# Patient Record
Sex: MALE | Race: WHITE | NOT HISPANIC OR LATINO | Employment: FULL TIME | URBAN - METROPOLITAN AREA
[De-identification: names, ages, dates, MRNs, and addresses within clinical notes are randomized per-mention and may not be internally consistent; named-entity substitution may affect disease eponyms.]

---

## 2018-03-21 ENCOUNTER — TRANSCRIBE ORDERS (OUTPATIENT)
Dept: ADMINISTRATIVE | Facility: HOSPITAL | Age: 43
End: 2018-03-21

## 2018-03-21 DIAGNOSIS — L03.113 CELLULITIS OF RIGHT HAND: Primary | ICD-10-CM

## 2018-03-22 ENCOUNTER — LAB REQUISITION (OUTPATIENT)
Dept: LAB | Facility: HOSPITAL | Age: 43
End: 2018-03-22

## 2018-03-22 DIAGNOSIS — Z00.00 ROUTINE GENERAL MEDICAL EXAMINATION AT A HEALTH CARE FACILITY: ICD-10-CM

## 2018-03-22 LAB
ALBUMIN SERPL-MCNC: 4.5 G/DL (ref 3.2–4.8)
ALBUMIN/GLOB SERPL: 1.5 G/DL (ref 1.5–2.5)
ALP SERPL-CCNC: 50 U/L (ref 25–100)
ALT SERPL W P-5'-P-CCNC: 30 U/L (ref 7–40)
ANION GAP SERPL CALCULATED.3IONS-SCNC: 9 MMOL/L (ref 3–11)
AST SERPL-CCNC: 25 U/L (ref 0–33)
BASOPHILS # BLD AUTO: 0.01 10*3/MM3 (ref 0–0.2)
BASOPHILS NFR BLD AUTO: 0.2 % (ref 0–1)
BILIRUB SERPL-MCNC: 0.6 MG/DL (ref 0.3–1.2)
BUN BLD-MCNC: 12 MG/DL (ref 9–23)
BUN/CREAT SERPL: 15 (ref 7–25)
CALCIUM SPEC-SCNC: 9.1 MG/DL (ref 8.7–10.4)
CHLORIDE SERPL-SCNC: 104 MMOL/L (ref 99–109)
CO2 SERPL-SCNC: 26 MMOL/L (ref 20–31)
CREAT BLD-MCNC: 0.8 MG/DL (ref 0.6–1.3)
CRP SERPL-MCNC: 0.04 MG/DL (ref 0–1)
DEPRECATED RDW RBC AUTO: 40.7 FL (ref 37–54)
EOSINOPHIL # BLD AUTO: 0.08 10*3/MM3 (ref 0–0.3)
EOSINOPHIL NFR BLD AUTO: 1.6 % (ref 0–3)
ERYTHROCYTE [DISTWIDTH] IN BLOOD BY AUTOMATED COUNT: 12.8 % (ref 11.3–14.5)
ERYTHROCYTE [SEDIMENTATION RATE] IN BLOOD: 8 MM/HR (ref 0–15)
GFR SERPL CREATININE-BSD FRML MDRD: 105 ML/MIN/1.73
GLOBULIN UR ELPH-MCNC: 3 GM/DL
GLUCOSE BLD-MCNC: 104 MG/DL (ref 70–100)
HCT VFR BLD AUTO: 40.6 % (ref 38.9–50.9)
HGB BLD-MCNC: 13.7 G/DL (ref 13.1–17.5)
IMM GRANULOCYTES # BLD: 0.01 10*3/MM3 (ref 0–0.03)
IMM GRANULOCYTES NFR BLD: 0.2 % (ref 0–0.6)
LYMPHOCYTES # BLD AUTO: 1.83 10*3/MM3 (ref 0.6–4.8)
LYMPHOCYTES NFR BLD AUTO: 37 % (ref 24–44)
MCH RBC QN AUTO: 29.3 PG (ref 27–31)
MCHC RBC AUTO-ENTMCNC: 33.7 G/DL (ref 32–36)
MCV RBC AUTO: 86.8 FL (ref 80–99)
MONOCYTES # BLD AUTO: 0.23 10*3/MM3 (ref 0–1)
MONOCYTES NFR BLD AUTO: 4.7 % (ref 0–12)
NEUTROPHILS # BLD AUTO: 2.78 10*3/MM3 (ref 1.5–8.3)
NEUTROPHILS NFR BLD AUTO: 56.3 % (ref 41–71)
PLATELET # BLD AUTO: 188 10*3/MM3 (ref 150–450)
PMV BLD AUTO: 11 FL (ref 6–12)
POTASSIUM BLD-SCNC: 3.8 MMOL/L (ref 3.5–5.5)
PROT SERPL-MCNC: 7.5 G/DL (ref 5.7–8.2)
RBC # BLD AUTO: 4.68 10*6/MM3 (ref 4.2–5.76)
SODIUM BLD-SCNC: 139 MMOL/L (ref 132–146)
WBC NRBC COR # BLD: 4.94 10*3/MM3 (ref 3.5–10.8)

## 2018-03-22 PROCEDURE — 80053 COMPREHEN METABOLIC PANEL: CPT

## 2018-03-22 PROCEDURE — 86140 C-REACTIVE PROTEIN: CPT

## 2018-03-22 PROCEDURE — 85025 COMPLETE CBC W/AUTO DIFF WBC: CPT

## 2018-03-22 PROCEDURE — 85652 RBC SED RATE AUTOMATED: CPT

## 2018-03-22 PROCEDURE — 36415 COLL VENOUS BLD VENIPUNCTURE: CPT

## 2018-03-23 ENCOUNTER — APPOINTMENT (OUTPATIENT)
Dept: INFUSION THERAPY | Facility: HOSPITAL | Age: 43
End: 2018-03-23
Attending: INTERNAL MEDICINE

## 2018-03-23 ENCOUNTER — TRANSCRIBE ORDERS (OUTPATIENT)
Dept: PHYSICAL THERAPY | Facility: HOSPITAL | Age: 43
End: 2018-03-23

## 2018-03-23 DIAGNOSIS — S61.239D: Primary | ICD-10-CM

## 2018-03-24 ENCOUNTER — HOSPITAL ENCOUNTER (OUTPATIENT)
Dept: PHYSICAL THERAPY | Facility: HOSPITAL | Age: 43
Setting detail: THERAPIES SERIES
Discharge: HOME | End: 2018-03-24

## 2018-03-24 DIAGNOSIS — M86.9 OSTEOMYELITIS OF FINGER OF RIGHT HAND (HCC): ICD-10-CM

## 2018-03-24 DIAGNOSIS — S61.302D OPEN WOUND OF RIGHT MIDDLE FINGER WITH DAMAGE TO NAIL, SUBSEQUENT ENCOUNTER: Primary | ICD-10-CM

## 2018-03-24 PROCEDURE — 97602 WOUND(S) CARE NON-SELECTIVE: CPT

## 2018-03-24 PROCEDURE — 97161 PT EVAL LOW COMPLEX 20 MIN: CPT

## 2018-03-24 NOTE — THERAPY EVALUATION
Outpatient Rehabilitation - Wound/Debridement Initial Eval   [place]     Patient Name: Vini Pope  : 1975  MRN: [id number]  Today's Date: 3/24/2018                  Admit Date: 3/24/2018    Visit Dx:    ICD-10-CM ICD-9-CM   1. Open wound of right middle finger with damage to nail, subsequent encounter S61.302D V58.89     883.0   2. Osteomyelitis of finger of right hand M86.9 730.24        There is no problem list on file for this patient.       History reviewed. No pertinent past medical history.     Past Surgical History:   Procedure Laterality Date   • CHOLECYSTECTOMY  2018             Patient History     Row Name 18 1300             History    Chief Complaint Ulcer, wound or other skin conditions;Pain  -      Type of Pain Hand pain  -      Date Current Problem(s) Began 18  -      Brief Description of Current Complaint Pt is an , who was out in the field in overgrown area and punctured R hand with thorn or wood fragment about 1.5 months ago.  His R middle finger remained red and swollen but he did not seek medical attention.  Several days ago, he asked a friend who is an MD to look at his hand.  XRAYs revealed possible osteomyelitis.  He was initially started on daily IM abx injections, MRI revealed possible osteo and pt was referred to [facility] office, receiving IV abx per Sai Gamble MD.  Pt now s/p i&D on 3/23/18 by Dr. Fabian, and has been referred for wound care.  -      Patient's Rating of General Health Very good  -      Hand Dominance right-handed  -      Occupation/sports/leisure activities Pt is an , lives alone.  -      Surgery/Hospitalization R long finger I&D 3/23/18 at [facility]  -         Pain     Pain Location Hand  -      Pain at Present 3  -      Pain at Best 0  -      Pain Description Shooting;Throbbing  -         Services    Prior Rehab/Home Health Experiences No  -      Are you currently receiving Home Health services  No  -JM      Do you plan to receive Home Health services in the near future No  -JM         Daily Activities    Teaching needs identified Home Exercise Program;Management of Condition  -JUAN RAMON      Patient is concerned about/has problems with Grasping objects lifting;Performing job responsibilities/community activities (work, school,;Repetitive movements of the hand, arm, shoulder;Writing/grasping items with hand(s)  -JUAN RAMON      Action taken for identified issues Education provided during pt evaluation/tx  -JM      Pt Participated in POC and Goals Yes  -        User Key  (r) = Recorded By, (t) = Taken By, (c) = Cosigned By    Initials Name Provider Type    JUAN RAMON Ludwig, PT Physical Therapist          EVALUATION        PT Ortho     Row Name 03/24/18 1300       Subjective Comments    Subjective Comments Pt states his last dose of pain medicine was at 2:30 this morning, since he is driving.  States he will get a friend to drive him next tx so he can take pain medicine prior to tx.  Pt mildly anxious about treatment, and states he can't come for daily tx due to work responsibilities (also has $45 co-pay and 25 therapy visit limit).  -       Subjective Pain    Able to rate subjective pain? yes  -JUAN RAMON    Pre-Treatment Pain Level 3  -JM    Post-Treatment Pain Level 3  -    Subjective Pain Comment Increased pain with packing removal and re-packing wound  -       General ROM    GENERAL ROM COMMENTS R fist closure impaired 50%, R long finger flexion limited approx 75%  -       Sensation    Sensation WNL? WNL  -       Transfers    Bed-Chair [place] (Transfers) independent  -    Chair-Bed [place] (Transfers) independent  -    Sit-Stand [place] (Transfers) independent  -    Stand-Sit [place] (Transfers) independent  -    Comment (Transfers) Initially seated in arjo chair, but became dizzy during tx and transferred to stretcher for remainder of tx.  -      User Key  (r) = Recorded By, (t)  "= Taken By, (c) = Cosigned By    Initials Name Provider Type    JUAN RAMON Ludwig, PT Physical Therapist                LDA Wound     Row Name 03/24/18 1300             Wound 03/24/18 1300 Right finger puncture;abscess    Wound - Properties Group Date first assessed: 03/24/18  - Time first assessed: 1300  -JM Present On Admission : yes;picture taken  - Side: Right  - Location: finger  - Type: puncture;abscess  - Additional Comments: dorsal long finger, puncture with septic arthritis s/p I&D 3/23/18  -    Wound Image Images linked: 2  -JM      Dressing Appearance intact;moist drainage;dried drainage   surgical dressing, reinforced  -      Closure Sutures  -      Base bleeding;clean;moist;pink;red/granulating  -      Red (%), Wound Tissue Color 100  -      Periwound moist;redness;swelling;edematous;warm  -      Edges open  -      Wound length (cm) 3.5 cm  -      Wound width (cm) 1.8 cm  -      Wound depth (cm) 0.4 cm  -      Drainage Characteristics/Odor sanguineous;bleeding controlled  -      Drainage Amount moderate  -      Care, Wound hydrotherapy provided   WP 8 min in H-F tank 104degF with chlorazene, AROM in tank  -      Dressing Care, Wound dressing applied;packed with;silver impregnated;hydrofiber;low-adherent;foam;gauze   aquacel ag ribbon 1/4-width, mepilex foam, 1\" conform/coban  -      Periwound Care, Wound cleansed with pH balanced cleanser;dry periwound area maintained  -        User Key  (r) = Recorded By, (t) = Taken By, (c) = Cosigned By    Initials Name Provider Type    JUAN RAMON Ludwig, PT Physical Therapist            WOUND DEBRIDEMENT  Debridement Site 1  Location- Site 1: R middle finger  Instruments- Site 1: other (comment) (gazue, cotton swabs)  Bleeding- Site 1: minimum, held pressure, 1 minute                   Therapy Education     Row Name 03/24/18 1300             Therapy Education    Education Details Keep dressing dry/intact until next tx  " -      Given Symptoms/condition management;Edema management;Bandaging/dressing change  -      Program New  -      How Provided Verbal;Demonstration  -      Provided to Patient  -      Level of Understanding Teach back education performed;Verbalized  -        User Key  (r) = Recorded By, (t) = Taken By, (c) = Cosigned By    Initials Name Provider Type    JUAN RAMON Ludwig, PT Physical Therapist          Recommendation and Plan        PT Assessment/Plan     Row Name 03/24/18 1300          PT Assessment    Functional Limitations Limitation in home management;Limitations in community activities;Performance in leisure activities;Performance in self-care ADL;Performance in work activities;Other (comment)   wound management  -     Impairments Dexterity;Edema;Integumentary integrity;Pain;Range of motion  -     Assessment Comments Pt presents with open wound of R middle finger s/p I&D 3/23/18 for septic arthritis.  Wound bed clean and red with mild bleeding with packing removal today.  Pt with approximately 75% impairment in R finger AROM.  Although pt is medically stable, he will require skilled PT for selective debridement and dressing management to promote wound closure, and education/therapeutic exercise to promote return of R hand AROM in order to return to normal work duties.  -     Please refer to paper survey for additional self-reported information Yes  -JM     Rehab Potential Good  -     Patient/caregiver participated in establishment of treatment plan and goals Yes  -     Patient would benefit from skilled therapy intervention Yes  -        PT Plan    PT Frequency 3x/week  -     Predicted Duration of Therapy Intervention (OT Eval) 12 visits  -JUAN RAMON     Planned CPT's? PT EVAL LOW COMPLEXITY: 98682;PT GUERO DEBRIDE OPEN WOUND UP TO 20 CM: 61462;PT NONSELECT DEBRIDE 15 MIN: 18938;PT THER PROC EA 15 MIN: 10454;PT MANUAL THERAPY EA 15 MIN: 36526  -     Physical Therapy Interventions (Optional  Details) patient/family education;wound care;manual therapy techniques;ROM (Range of Motion)  -     PT Plan Comments MIST not a covered service  -       User Key  (r) = Recorded By, (t) = Taken By, (c) = Cosigned By    Initials Name Provider Type    JUAN RAMON Ludwig, PT Physical Therapist            Goals          PT OP Goals     Row Name 03/24/18 1300          PT Short Term Goals    STG 1 Patient and/ or caregiver able to verbalize signs and symptoms of infection.  -     STG 2 Decrease wound dimensions by 25% as evidence of wound closure.  -     STG 3 Patient independent and compliant with initial home exercise program focused on range of motion, and flexibility to improve blood flow to facilitate healing.  -        Long Term Goals    LTG 1 Patient and/ or caregiver independent with clean dressing changes.  -     LTG 2 Decrease wound dimensions by 75% as evidence of wound closure.  -     LTG 3 Patient to report decrease in pain to 2/10, to facilitate improved functional mobility.  -        Time Calculation    PT Goal Re-Cert Due Date 06/22/18  -       User Key  (r) = Recorded By, (t) = Taken By, (c) = Cosigned By    Initials Name Provider Type    JUAN RAMON Ludwig, PT Physical Therapist          Time Calculation: Start Time: 1300    Therapy Charges for Today     Code Description Service Date Service Provider Modifiers Qty    13668123114 HC NONSELECTIVE DEBRIDEMENT 3/24/2018 Taniya Ludwig, PT GP 1    63984966309 HC PT EVAL LOW COMPLEXITY 4 3/24/2018 Taniya Ludwig, PT GP 1          PT G-Codes  Outcome Measure Options: Quick DASH     Taniya Ludwig, PT  3/24/2018

## 2018-03-26 ENCOUNTER — HOSPITAL ENCOUNTER (OUTPATIENT)
Dept: PHYSICAL THERAPY | Facility: HOSPITAL | Age: 43
Setting detail: THERAPIES SERIES
Discharge: HOME | End: 2018-03-26

## 2018-03-26 ENCOUNTER — CLINICAL SUPPORT (OUTPATIENT)
Dept: INFUSION THERAPY | Facility: HOSPITAL | Age: 43
End: 2018-03-26
Attending: INTERNAL MEDICINE

## 2018-03-26 ENCOUNTER — LAB REQUISITION (OUTPATIENT)
Dept: LAB | Facility: HOSPITAL | Age: 43
End: 2018-03-26

## 2018-03-26 VITALS
RESPIRATION RATE: 18 BRPM | DIASTOLIC BLOOD PRESSURE: 74 MMHG | TEMPERATURE: 97.7 F | SYSTOLIC BLOOD PRESSURE: 109 MMHG | HEART RATE: 63 BPM

## 2018-03-26 DIAGNOSIS — S61.302D OPEN WOUND OF RIGHT MIDDLE FINGER WITH DAMAGE TO NAIL, SUBSEQUENT ENCOUNTER: Primary | ICD-10-CM

## 2018-03-26 DIAGNOSIS — Z00.00 ROUTINE GENERAL MEDICAL EXAMINATION AT A HEALTH CARE FACILITY: ICD-10-CM

## 2018-03-26 DIAGNOSIS — M86.9 OSTEOMYELITIS OF FINGER OF RIGHT HAND (HCC): ICD-10-CM

## 2018-03-26 DIAGNOSIS — S61.232A: Primary | ICD-10-CM

## 2018-03-26 LAB
ALBUMIN SERPL-MCNC: 4.4 G/DL (ref 3.2–4.8)
ALBUMIN/GLOB SERPL: 1.5 G/DL (ref 1.5–2.5)
ALP SERPL-CCNC: 50 U/L (ref 25–100)
ALT SERPL W P-5'-P-CCNC: 34 U/L (ref 7–40)
ANION GAP SERPL CALCULATED.3IONS-SCNC: 8 MMOL/L (ref 3–11)
AST SERPL-CCNC: 28 U/L (ref 0–33)
BASOPHILS # BLD AUTO: 0.02 10*3/MM3 (ref 0–0.2)
BASOPHILS NFR BLD AUTO: 0.4 % (ref 0–1)
BILIRUB SERPL-MCNC: 0.7 MG/DL (ref 0.3–1.2)
BUN BLD-MCNC: 13 MG/DL (ref 9–23)
BUN/CREAT SERPL: 13 (ref 7–25)
CALCIUM SPEC-SCNC: 9.5 MG/DL (ref 8.7–10.4)
CHLORIDE SERPL-SCNC: 103 MMOL/L (ref 99–109)
CO2 SERPL-SCNC: 31 MMOL/L (ref 20–31)
CREAT BLD-MCNC: 1 MG/DL (ref 0.6–1.3)
CRP SERPL-MCNC: 0.16 MG/DL (ref 0–1)
DEPRECATED RDW RBC AUTO: 41.4 FL (ref 37–54)
EOSINOPHIL # BLD AUTO: 0.11 10*3/MM3 (ref 0–0.3)
EOSINOPHIL NFR BLD AUTO: 2.1 % (ref 0–3)
ERYTHROCYTE [DISTWIDTH] IN BLOOD BY AUTOMATED COUNT: 13 % (ref 11.3–14.5)
ERYTHROCYTE [SEDIMENTATION RATE] IN BLOOD: 7 MM/HR (ref 0–15)
GFR SERPL CREATININE-BSD FRML MDRD: 81 ML/MIN/1.73
GLOBULIN UR ELPH-MCNC: 3 GM/DL
GLUCOSE BLD-MCNC: 109 MG/DL (ref 70–100)
HCT VFR BLD AUTO: 41.2 % (ref 38.9–50.9)
HGB BLD-MCNC: 14 G/DL (ref 13.1–17.5)
IMM GRANULOCYTES # BLD: 0.01 10*3/MM3 (ref 0–0.03)
IMM GRANULOCYTES NFR BLD: 0.2 % (ref 0–0.6)
LYMPHOCYTES # BLD AUTO: 1.91 10*3/MM3 (ref 0.6–4.8)
LYMPHOCYTES NFR BLD AUTO: 36 % (ref 24–44)
MCH RBC QN AUTO: 29.9 PG (ref 27–31)
MCHC RBC AUTO-ENTMCNC: 34 G/DL (ref 32–36)
MCV RBC AUTO: 88 FL (ref 80–99)
MONOCYTES # BLD AUTO: 0.33 10*3/MM3 (ref 0–1)
MONOCYTES NFR BLD AUTO: 6.2 % (ref 0–12)
NEUTROPHILS # BLD AUTO: 2.92 10*3/MM3 (ref 1.5–8.3)
NEUTROPHILS NFR BLD AUTO: 55.1 % (ref 41–71)
PLATELET # BLD AUTO: 206 10*3/MM3 (ref 150–450)
PMV BLD AUTO: 11.7 FL (ref 6–12)
POTASSIUM BLD-SCNC: 4.2 MMOL/L (ref 3.5–5.5)
PROT SERPL-MCNC: 7.4 G/DL (ref 5.7–8.2)
RBC # BLD AUTO: 4.68 10*6/MM3 (ref 4.2–5.76)
SODIUM BLD-SCNC: 142 MMOL/L (ref 132–146)
WBC NRBC COR # BLD: 5.3 10*3/MM3 (ref 3.5–10.8)

## 2018-03-26 PROCEDURE — 97597 DBRDMT OPN WND 1ST 20 CM/<: CPT

## 2018-03-26 PROCEDURE — 85025 COMPLETE CBC W/AUTO DIFF WBC: CPT | Performed by: INTERNAL MEDICINE

## 2018-03-26 PROCEDURE — 86140 C-REACTIVE PROTEIN: CPT | Performed by: INTERNAL MEDICINE

## 2018-03-26 PROCEDURE — C1894 INTRO/SHEATH, NON-LASER: HCPCS

## 2018-03-26 PROCEDURE — 36415 COLL VENOUS BLD VENIPUNCTURE: CPT | Performed by: INTERNAL MEDICINE

## 2018-03-26 PROCEDURE — 85652 RBC SED RATE AUTOMATED: CPT | Performed by: INTERNAL MEDICINE

## 2018-03-26 PROCEDURE — 80053 COMPREHEN METABOLIC PANEL: CPT | Performed by: INTERNAL MEDICINE

## 2018-03-26 PROCEDURE — C1751 CATH, INF, PER/CENT/MIDLINE: HCPCS

## 2018-03-26 RX ORDER — CEFTRIAXONE 1 G/1
INJECTION, POWDER, FOR SOLUTION INTRAMUSCULAR; INTRAVENOUS
COMMUNITY

## 2018-03-26 RX ORDER — SODIUM CHLORIDE 0.9 % (FLUSH) 0.9 %
10 SYRINGE (ML) INJECTION AS NEEDED
Status: CANCELLED | OUTPATIENT
Start: 2018-03-26

## 2018-03-26 NOTE — NURSING NOTE
4FR PICC PLACED in the right arm, pt complained of pain in hand with first attempt, needle withdrawn and new attempt made. PICC tip verified by 3CG. After PICC procedure complete patient now has complaints of discomfort in right back shoulder area. If no relief after 30 minutes of rest PICC will be removed.

## 2018-03-26 NOTE — THERAPY WOUND CARE TREATMENT
Outpatient Rehabilitation - Wound/Debridement Treatment Note  [facility]     Patient Name: Vini Pope  : 1975  MRN: [id number]  Today's Date: 3/26/2018                 Admit Date: 3/26/2018    Visit Dx:    ICD-10-CM ICD-9-CM   1. Open wound of right middle finger with damage to nail, subsequent encounter S61.302D V58.89     883.0   2. Osteomyelitis of finger of right hand M86.9 730.24        There is no problem list on file for this patient.       No past medical history on file.     Past Surgical History:   Procedure Laterality Date   • CHOLECYSTECTOMY  2018         EVALUATION        PT Ortho     Row Name 18 1600       Subjective Comments    Subjective Comments Pt s/p PICC line placement RUE, c/o RUE and mid-back pain.  -       Subjective Pain    Able to rate subjective pain? yes  -    Pre-Treatment Pain Level 1  -    Post-Treatment Pain Level 1  -    Subjective Pain Comment R middle finger, RUE PICC line 4/10  -       Transfers    Sit-Stand [place] (Transfers) independent  -JM    Stand-Sit [place] (Transfers) independent  -JM    Comment (Transfers) Reclined on stretcher for tx  -    Row Name 18 1300       Subjective Comments    Subjective Comments Pt states his last dose of pain medicine was at 2:30 this morning, since he is driving.  States he will get a friend to drive him next tx so he can take pain medicine prior to tx.  Pt mildly anxious about treatment, and states he can't come for daily tx due to work responsibilities (also has $45 co-pay and 25 therapy visit limit).  -       Subjective Pain    Able to rate subjective pain? yes  -    Pre-Treatment Pain Level 3  -    Post-Treatment Pain Level 3  -    Subjective Pain Comment Increased pain with packing removal and re-packing wound  -       General ROM    GENERAL ROM COMMENTS R fist closure impaired 50%, R long finger flexion limited approx 75%  -       Sensation    Sensation WNL? WNL  -JM  "      Transfers    Bed-Chair [place] (Transfers) independent  -JM    Chair-Bed [place] (Transfers) independent  -JM    Sit-Stand [place] (Transfers) independent  -JM    Stand-Sit [place] (Transfers) independent  -JM    Comment (Transfers) Initially seated in arjo chair, but became dizzy during tx and transferred to stretcher for remainder of tx.  -      User Key  (r) = Recorded By, (t) = Taken By, (c) = Cosigned By    Initials Name Provider Type    JUAN RAMON Ludwig, PT Physical Therapist                    LDA Wound     Row Name 03/26/18 1600             Wound 03/24/18 1300 Right finger puncture;abscess    Wound - Properties Group Date first assessed: 03/24/18  - Time first assessed: 1300  -JM Present On Admission : yes;picture taken  -JM Side: Right  - Location: finger  - Type: puncture;abscess  -JM Additional Comments: dorsal long finger, puncture with septic arthritis s/p I&D 3/23/18  -    Wound Image Images linked: 2  -JM      Dressing Appearance intact;moist drainage;dried drainage   JOE except for packing still in place, removed in MD office  -      Closure Sutures  -      Base clean;moist;pink;red/granulating;white;slough  -JM      Red (%), Wound Tissue Color 90  -JM      Yellow (%), Wound Tissue Color 10  -JM      Periwound moist;redness;swelling;edematous;warm   tissues macerated after WP  -JM      Edges open  -      Drainage Characteristics/Odor sanguineous;serosanguineous  -      Drainage Amount small  -JM      Care, Wound hydrotherapy provided;debrided   pt performing AROM in WP tank 8 min  -      Dressing Care, Wound dressing applied;silver impregnated;hydrofiber;gauze   aquacel ag, ribbon, mepilex foam, 1\" conform/coban  -      Periwound Care, Wound cleansed with pH balanced cleanser;dry periwound area maintained  -        User Key  (r) = Recorded By, (t) = Taken By, (c) = Cosigned By    Initials Name Provider Type    JUAN RAMON Ludwig, PT Physical " Therapist            WOUND DEBRIDEMENT  Debridement Site 1  Location- Site 1: R middle finger  Selective Debridement- Site 1: Wound Surface <20cmsq  Instruments- Site 1: tweezers, other (comment) (gazue, cotton swabs)  Excised Tissue Description- Site 1: scant, slough  Bleeding- Site 1: none                   Therapy Education     Row Name 03/26/18 1600             Therapy Education    Education Details Elevate RUE to reduce edema, continue with AROM and use of R hand as tolerated.  -      Given Symptoms/condition management;Edema management;Bandaging/dressing change  -      Program Reinforced  -JUAN RAMON      How Provided Verbal;Demonstration  -      Provided to Patient  -      Level of Understanding Teach back education performed;Verbalized  -        User Key  (r) = Recorded By, (t) = Taken By, (c) = Cosigned By    Initials Name Provider Type    JUAN RAMON Ludwig, PT Physical Therapist          Recommendation and Plan        PT Assessment/Plan     Row Name 03/26/18 1600          PT Assessment    Functional Limitations Limitation in home management;Limitations in community activities;Performance in leisure activities;Performance in self-care ADL;Performance in work activities;Other (comment)   wound management  -     Impairments Dexterity;Edema;Integumentary integrity;Pain;Range of motion  -     Assessment Comments Pt with increase in R fist closure and able to perform opposition of R thumb to R middle finger today.  Wound beds pink with scant slough around wound edges.  Tissues macerated today after WP, so may only intermittantly perform WP as indicated to assist with debridement.  Packing was adherent to sutures today due to dried drainage, so required WP to remove packing.  MIST currently not a covered service thru pt's insurance, but may request appeal if wound not progressing as expected with moist dressings and debridement.  -     Rehab Potential Good  -     Patient/caregiver participated in  establishment of treatment plan and goals Yes  -     Patient would benefit from skilled therapy intervention Yes  -        PT Plan    PT Frequency 3x/week  -     Physical Therapy Interventions (Optional Details) home exercise program;wound care;patient/family education  -     PT Plan Comments debridement, AROM, WP if indicated  -       User Key  (r) = Recorded By, (t) = Taken By, (c) = Cosigned By    Initials Name Provider Type    JUAN RAMON Ludwig, PT Physical Therapist          Goals        PT OP Goals     Row Name 03/26/18 1600          Time Calculation    PT Goal Re-Cert Due Date 06/22/18  -       User Key  (r) = Recorded By, (t) = Taken By, (c) = Cosigned By    Initials Name Provider Type    JUAN RAMON Ludwig, PT Physical Therapist          PT Goal Re-Cert Due Date: 06/22/18            Time Calculation: Start Time: 1600    Therapy Charges for Today     Code Description Service Date Service Provider Modifiers Qty    [id number]  GUERO DEBRIDE OPEN WOUND UP TO 20CM 3/26/2018 Taniya Ludwig, PT GP 1                Taniya Ludwig, PT  3/26/2018

## 2018-03-26 NOTE — NURSING NOTE
He now states that he feels somewhat better. Our office notified and he is headed there now to be infused and monitored, they will pull PICC if needed. Report given to Lilly HAQUE at [facility].

## 2018-03-26 NOTE — NURSING NOTE
1500-   PICC placed per rt upper  Arm, occlusive  Dressing intact, no redness, drainage or swelling noted. C/o some slight discomfort  Center of back between shoulder blades. Warm blanket appplied and pt instructed to rest against for 5 minutes to see if discomfort releived. Picc nurses discussed with pt. Pt going to office for infusion and office notified per PICC nurses. Pt without other c/o's d/c'd to office.

## 2018-03-28 ENCOUNTER — HOSPITAL ENCOUNTER (OUTPATIENT)
Dept: PHYSICAL THERAPY | Facility: HOSPITAL | Age: 43
Setting detail: THERAPIES SERIES
Discharge: HOME | End: 2018-03-28

## 2018-03-28 DIAGNOSIS — S61.302D OPEN WOUND OF RIGHT MIDDLE FINGER WITH DAMAGE TO NAIL, SUBSEQUENT ENCOUNTER: Primary | ICD-10-CM

## 2018-03-28 PROCEDURE — 97597 DBRDMT OPN WND 1ST 20 CM/<: CPT

## 2018-03-28 NOTE — THERAPY WOUND CARE TREATMENT
Outpatient Rehabilitation - Wound/Debridement Treatment Note  [facility]     Patient Name: Vini Pope  : 1975  MRN: [id number]  Today's Date: 3/28/2018                 Admit Date: 3/28/2018    Visit Dx:    ICD-10-CM ICD-9-CM   1. Open wound of right middle finger with damage to nail, subsequent encounter S61.302D V58.89     883.0       There is no problem list on file for this patient.       No past medical history on file.     Past Surgical History:   Procedure Laterality Date   • CHOLECYSTECTOMY  2018         EVALUATION        PT Ortho     Row Name 18 1355       Subjective Comments    Subjective Comments Pt stated pain improving with no significant increase with tx  -       Subjective Pain    Able to rate subjective pain? yes  -    Pre-Treatment Pain Level 0  -    Post-Treatment Pain Level 1  -MF       Transfers    Bed-Chair [place] (Transfers) independent  -    Chair-Bed [place] (Transfers) independent  -    Sit-Stand [place] (Transfers) independent  -    Stand-Sit [place] (Transfers) independent  -    Comment (Transfers) reclined on stretcher for tx  -MF    Row Name 18 1600       Subjective Comments    Subjective Comments Pt s/p PICC line placement RUE, c/o RUE and mid-back pain.  -       Subjective Pain    Able to rate subjective pain? yes  -    Pre-Treatment Pain Level 1  -    Post-Treatment Pain Level 1  -    Subjective Pain Comment R middle finger, RUE PICC line 4/10  -JM       Transfers    Sit-Stand [place] (Transfers) independent  -    Stand-Sit [place] (Transfers) independent  -    Comment (Transfers) Reclined on stretcher for tx  -      User Key  (r) = Recorded By, (t) = Taken By, (c) = Cosigned By    Initials Name Provider Type     Peter Grossman, PT Physical Therapist    JUAN RAMON Ludwig, PT Physical Therapist                    [facility] Wound     Row Name 18 1555             Wound 18 1300  Right finger puncture;abscess    Wound - Properties Group Date first assessed: 03/24/18  - Time first assessed: 1300  -JM Present On Admission : yes;picture taken  - Side: Right  - Location: finger  - Type: puncture;abscess  - Additional Comments: dorsal long finger, puncture with septic arthritis s/p I&D 3/23/18  -    Dressing Appearance intact;moist drainage;dried drainage  -MF      Closure Sutures  -MF      Base clean;moist;pink;red/granulating;slough  -MF      Periwound moist;redness;swelling;edematous  -MF      Edges open  -MF      Drainage Characteristics/Odor sanguineous;serosanguineous  -MF      Drainage Amount small  -MF      Care, Wound irrigated with;sterile normal saline;debrided  -      Dressing Care, Wound low-adherent;foam   aquacel Ag to pack wounds with mepilex foam and comform to cover / secure  -        User Key  (r) = Recorded By, (t) = Taken By, (c) = Cosigned By    Initials Name Provider Type    TANVIR Grossman, PT Physical Therapist    JUAN RAMON Ludwig, PT Physical Therapist            WOUND DEBRIDEMENT  Debridement Site 1  Location- Site 1: R middle finger  Selective Debridement- Site 1: Wound Surface <20cmsq  Instruments- Site 1: tweezers  Excised Tissue Description- Site 1: minimum, slough  Bleeding- Site 1: none                   Therapy Education     Row Name 03/28/18 6269             Therapy Education    Education Details cont with finger ROM and UE elevation  -MF      Given Symptoms/condition management;Edema management;Bandaging/dressing change  -MF      Program Reinforced  -MF      How Provided Verbal;Demonstration  -MF      Provided to Patient  -MF      Level of Understanding Teach back education performed;Verbalized  -        User Key  (r) = Recorded By, (t) = Taken By, (c) = Cosigned By    Initials Name Provider Type    TANVIR Grossman, PT Physical Therapist          Recommendation and Plan        PT Assessment/Plan     Row Name 03/28/18 4364           PT Assessment    Functional Limitations Limitation in home management;Limitations in community activities;Performance in leisure activities;Performance in self-care ADL;Performance in work activities;Other (comment)  -     Impairments Dexterity;Edema;Integumentary integrity;Pain;Range of motion  -     Assessment Comments Pt cont to make excellent progress with wound care. ROM increasing with decreased pain and edema.  PT held w/p today to determine further need vs basic dressing changes and HEP.  PT will recheck ROM next vist and if it cont to improve without w/p will d/c and progress to independent management of wound by pt.   -     Rehab Potential Good  -     Patient/caregiver participated in establishment of treatment plan and goals Yes  -     Patient would benefit from skilled therapy intervention Yes  -        PT Plan    PT Frequency 2x/week;3x/week  -     Physical Therapy Interventions (Optional Details) wound care;patient/family education  -     PT Plan Comments debridement, AROM, HEP education, maybe d/c w/p next visit.   -       User Key  (r) = Recorded By, (t) = Taken By, (c) = Cosigned By    Initials Name Provider Type     Peter Grossman PT Physical Therapist          Goals        PT OP Goals     Row Name 03/28/18 1555          Time Calculation    PT Goal Re-Cert Due Date 06/22/18  -       User Key  (r) = Recorded By, (t) = Taken By, (c) = Cosigned By    Initials Name Provider Type     Peter Grossman, PT Physical Therapist          PT Goal Re-Cert Due Date: 06/22/18            Time Calculation: Start Time: 1555  Total Timed Code Minutes- PT: 25 minute(s)    Therapy Charges for Today     Code Description Service Date Service Provider Modifiers Qty    [id number]  GUERO DEBRIDE OPEN WOUND UP TO 20CM 3/28/2018 Peter Grossman, PT GP 1                Peter Grossman, PT  3/28/2018

## 2018-03-31 ENCOUNTER — HOSPITAL ENCOUNTER (OUTPATIENT)
Dept: PHYSICAL THERAPY | Facility: HOSPITAL | Age: 43
Setting detail: THERAPIES SERIES
Discharge: HOME | End: 2018-03-31

## 2018-03-31 DIAGNOSIS — S61.302D OPEN WOUND OF RIGHT MIDDLE FINGER WITH DAMAGE TO NAIL, SUBSEQUENT ENCOUNTER: Primary | ICD-10-CM

## 2018-03-31 DIAGNOSIS — M86.9 OSTEOMYELITIS OF FINGER OF RIGHT HAND (HCC): ICD-10-CM

## 2018-03-31 PROCEDURE — 97597 DBRDMT OPN WND 1ST 20 CM/<: CPT

## 2018-04-03 ENCOUNTER — LAB REQUISITION (OUTPATIENT)
Dept: LAB | Facility: HOSPITAL | Age: 43
End: 2018-04-03

## 2018-04-03 ENCOUNTER — HOSPITAL ENCOUNTER (OUTPATIENT)
Dept: PHYSICAL THERAPY | Facility: HOSPITAL | Age: 43
Setting detail: THERAPIES SERIES
Discharge: HOME | End: 2018-04-03

## 2018-04-03 DIAGNOSIS — Z00.00 ROUTINE GENERAL MEDICAL EXAMINATION AT A HEALTH CARE FACILITY: ICD-10-CM

## 2018-04-03 DIAGNOSIS — S61.302D OPEN WOUND OF RIGHT MIDDLE FINGER WITH DAMAGE TO NAIL, SUBSEQUENT ENCOUNTER: Primary | ICD-10-CM

## 2018-04-03 DIAGNOSIS — M86.9 OSTEOMYELITIS OF FINGER OF RIGHT HAND (HCC): ICD-10-CM

## 2018-04-03 LAB
ALBUMIN SERPL-MCNC: 4.4 G/DL (ref 3.2–4.8)
ALBUMIN/GLOB SERPL: 1.5 G/DL (ref 1.5–2.5)
ALP SERPL-CCNC: 50 U/L (ref 25–100)
ALT SERPL W P-5'-P-CCNC: 25 U/L (ref 7–40)
ANION GAP SERPL CALCULATED.3IONS-SCNC: 14 MMOL/L (ref 3–11)
AST SERPL-CCNC: 21 U/L (ref 0–33)
BASOPHILS # BLD AUTO: 0.04 10*3/MM3 (ref 0–0.2)
BASOPHILS NFR BLD AUTO: 0.7 % (ref 0–1)
BILIRUB SERPL-MCNC: 0.7 MG/DL (ref 0.3–1.2)
BUN BLD-MCNC: 11 MG/DL (ref 9–23)
BUN/CREAT SERPL: 12.2 (ref 7–25)
CALCIUM SPEC-SCNC: 9.3 MG/DL (ref 8.7–10.4)
CHLORIDE SERPL-SCNC: 101 MMOL/L (ref 99–109)
CO2 SERPL-SCNC: 27 MMOL/L (ref 20–31)
CREAT BLD-MCNC: 0.9 MG/DL (ref 0.6–1.3)
CRP SERPL-MCNC: 0.04 MG/DL (ref 0–1)
DEPRECATED RDW RBC AUTO: 40.8 FL (ref 37–54)
EOSINOPHIL # BLD AUTO: 0.17 10*3/MM3 (ref 0–0.3)
EOSINOPHIL NFR BLD AUTO: 3.1 % (ref 0–3)
ERYTHROCYTE [DISTWIDTH] IN BLOOD BY AUTOMATED COUNT: 13.1 % (ref 11.3–14.5)
ERYTHROCYTE [SEDIMENTATION RATE] IN BLOOD: 11 MM/HR (ref 0–15)
GFR SERPL CREATININE-BSD FRML MDRD: 92 ML/MIN/1.73
GLOBULIN UR ELPH-MCNC: 3 GM/DL
GLUCOSE BLD-MCNC: 104 MG/DL (ref 70–100)
HCT VFR BLD AUTO: 41.4 % (ref 38.9–50.9)
HGB BLD-MCNC: 14.4 G/DL (ref 13.1–17.5)
IMM GRANULOCYTES # BLD: 0.01 10*3/MM3 (ref 0–0.03)
IMM GRANULOCYTES NFR BLD: 0.2 % (ref 0–0.6)
LYMPHOCYTES # BLD AUTO: 1.95 10*3/MM3 (ref 0.6–4.8)
LYMPHOCYTES NFR BLD AUTO: 36 % (ref 24–44)
MCH RBC QN AUTO: 30 PG (ref 27–31)
MCHC RBC AUTO-ENTMCNC: 34.8 G/DL (ref 32–36)
MCV RBC AUTO: 86.3 FL (ref 80–99)
MONOCYTES # BLD AUTO: 0.32 10*3/MM3 (ref 0–1)
MONOCYTES NFR BLD AUTO: 5.9 % (ref 0–12)
NEUTROPHILS # BLD AUTO: 2.92 10*3/MM3 (ref 1.5–8.3)
NEUTROPHILS NFR BLD AUTO: 54.1 % (ref 41–71)
PLATELET # BLD AUTO: 203 10*3/MM3 (ref 150–450)
PMV BLD AUTO: 11.8 FL (ref 6–12)
POTASSIUM BLD-SCNC: 4.1 MMOL/L (ref 3.5–5.5)
PROT SERPL-MCNC: 7.4 G/DL (ref 5.7–8.2)
RBC # BLD AUTO: 4.8 10*6/MM3 (ref 4.2–5.76)
SODIUM BLD-SCNC: 142 MMOL/L (ref 132–146)
WBC NRBC COR # BLD: 5.41 10*3/MM3 (ref 3.5–10.8)

## 2018-04-03 PROCEDURE — 85652 RBC SED RATE AUTOMATED: CPT | Performed by: INTERNAL MEDICINE

## 2018-04-03 PROCEDURE — 86140 C-REACTIVE PROTEIN: CPT | Performed by: INTERNAL MEDICINE

## 2018-04-03 PROCEDURE — 36415 COLL VENOUS BLD VENIPUNCTURE: CPT | Performed by: INTERNAL MEDICINE

## 2018-04-03 PROCEDURE — 80053 COMPREHEN METABOLIC PANEL: CPT | Performed by: INTERNAL MEDICINE

## 2018-04-03 PROCEDURE — 97597 DBRDMT OPN WND 1ST 20 CM/<: CPT

## 2018-04-03 PROCEDURE — 85025 COMPLETE CBC W/AUTO DIFF WBC: CPT | Performed by: INTERNAL MEDICINE

## 2018-04-03 NOTE — THERAPY WOUND CARE TREATMENT
Outpatient Rehabilitation - Wound/Debridement Treatment Note  [facility]     Patient Name: Vini Pope  : 1975  MRN: [id number]  Today's Date: 4/3/2018                 Admit Date: 4/3/2018    Visit Dx:    ICD-10-CM ICD-9-CM   1. Open wound of right middle finger with damage to nail, subsequent encounter S61.302D V58.89     883.0   2. Osteomyelitis of finger of right hand M86.9 730.24      Past Surgical History:   Procedure Laterality Date   • CHOLECYSTECTOMY  2018         EVALUATION        PT Ortho     Row Name 18 1100       Subjective Comments    Subjective Comments Pt without complaint. F/U with [facility] today and MD pleased with progress. F/U with surgeon Thurs.   -ES       Subjective Pain    Able to rate subjective pain? yes  -ES    Pre-Treatment Pain Level 0  -ES    Post-Treatment Pain Level 0  -ES       Transfers    Comment (Transfers) sitting in chair for treatment  -ES    Row Name 18 1300       Subjective Comments    Subjective Comments Pt c/o pain with ROM and pressure to finger, but no daily pain  -       Subjective Pain    Able to rate subjective pain? yes  -    Pre-Treatment Pain Level 0  -    Post-Treatment Pain Level 1  -MF       Transfers    Bed-Chair [place] (Transfers) independent  -    Chair-Bed [place] (Transfers) independent  -    Sit-Stand [place] (Transfers) independent  -    Stand-Sit [place] (Transfers) independent  -    Comment (Transfers) reclined on stretcher  -      User Key  (r) = Recorded By, (t) = Taken By, (c) = Cosigned By    Initials Name Provider Type     Peter Grossman, PT Physical Therapist    ES Tracey Nash, PT Physical Therapist                    [facility] Wound     Row Name 18 1100             Wound 18 1300 Right finger puncture;abscess    Wound - Properties Group Date first assessed: 18  - Time first assessed: 1300  -JM Present On Admission : yes;picture taken  - Side: Right  -  "Location: finger  - Type: puncture;abscess  - Additional Comments: dorsal long finger, puncture with septic arthritis s/p I&D 3/23/18  -    Dressing Appearance intact;dried drainage  -ES      Closure Sutures  -ES      Base clean;moist;pink;red/granulating  -ES      Red (%), Wound Tissue Color 100  -ES      Periwound moist;redness;swelling;edematous  -ES      Edges open  -ES      Wound length (cm) 3.5 cm  -ES      Wound width (cm) 0.8 cm  -ES      Wound depth (cm) 0.2 cm  -ES      Drainage Characteristics/Odor serosanguineous  -ES      Drainage Amount small  -ES      Care, Wound irrigated with;wound cleanser;debrided  -ES      Dressing Care, Wound dressing applied;silver impregnated;low-adherent;foam;gauze   Mepilex Ag foam, 1\"conform, 1\"coban  -ES      Periwound Care, Wound cleansed with pH balanced cleanser;dry periwound area maintained  -ES        User Key  (r) = Recorded By, (t) = Taken By, (c) = Cosigned By    Initials Name Provider Type    GILSON Nash, PT Physical Therapist     Taniya Ludwig, PT Physical Therapist            WOUND DEBRIDEMENT  Debridement Site 1  Location- Site 1: R middle finger  Selective Debridement- Site 1: Wound Surface <20cmsq  Instruments- Site 1: tweezers  Excised Tissue Description- Site 1: minimum, other (comment) (hypertrophic crust to mid incision)  Bleeding- Site 1: none                   Therapy Education     Row Name 04/03/18 1100             Therapy Education    Education Details continued use of compression, ROM exercise and call to schedule F/U pending Surgeon opinion on Thurs  -ES      Given Symptoms/condition management;Edema management;Bandaging/dressing change  -ES      Program Reinforced  -ES      How Provided Verbal;Demonstration  -ES      Provided to Patient  -ES      Level of Understanding Teach back education performed;Verbalized  -ES        User Key  (r) = Recorded By, (t) = Taken By, (c) = Cosigned By    Initials Name Provider Type    GILSON BRAGG" Saad, PT Physical Therapist          Recommendation and Plan        PT Assessment/Plan     Row Name 04/03/18 1100          PT Assessment    Functional Limitations Limitation in home management;Limitations in community activities;Performance in leisure activities;Performance in self-care ADL;Performance in work activities;Other (comment)  -ES     Impairments Dexterity;Edema;Integumentary integrity;Pain;Range of motion  -ES     Assessment Comments Moist granulating wound bed revealed after light debridement of hypertrophic crust. New epithelial tissue present and overall wound presentation positive. Patient is likely close to transistioning from wound care to a hand therapist to begin working on ROM and  strength. Patient is agreeable to call and make F/U appt with PT after surgeon f/u on 4/5.   -ES        PT Plan    Physical Therapy Interventions (Optional Details) patient/family education;wound care  -ES     PT Plan Comments debridement PRN, education on compression/ROM ?D/C pending MD F/U  -ES       User Key  (r) = Recorded By, (t) = Taken By, (c) = Cosigned By    Initials Name Provider Type    ES Tracey Nash, PT Physical Therapist          Time Calculation: Start Time: 1100    Therapy Charges for Today     Code Description Service Date Service Provider Modifiers Qty    [id number] [facility] DEBRIDE OPEN WOUND UP TO 20CM 4/3/2018 Tracey Nash, PT GP 1                Tracey Nash, PT  4/3/2018

## 2018-04-10 ENCOUNTER — LAB REQUISITION (OUTPATIENT)
Dept: LAB | Facility: HOSPITAL | Age: 43
End: 2018-04-10

## 2018-04-10 DIAGNOSIS — Z00.00 ROUTINE GENERAL MEDICAL EXAMINATION AT A HEALTH CARE FACILITY: ICD-10-CM

## 2018-04-10 LAB
ALBUMIN SERPL-MCNC: 4.7 G/DL (ref 3.2–4.8)
ALBUMIN/GLOB SERPL: 1.6 G/DL (ref 1.5–2.5)
ALP SERPL-CCNC: 49 U/L (ref 25–100)
ALT SERPL W P-5'-P-CCNC: 27 U/L (ref 7–40)
ANION GAP SERPL CALCULATED.3IONS-SCNC: 7 MMOL/L (ref 3–11)
AST SERPL-CCNC: 21 U/L (ref 0–33)
BASOPHILS # BLD AUTO: 0.06 10*3/MM3 (ref 0–0.2)
BASOPHILS NFR BLD AUTO: 1 % (ref 0–1)
BILIRUB SERPL-MCNC: 0.5 MG/DL (ref 0.3–1.2)
BUN BLD-MCNC: 13 MG/DL (ref 9–23)
BUN/CREAT SERPL: 13 (ref 7–25)
CALCIUM SPEC-SCNC: 9.5 MG/DL (ref 8.7–10.4)
CHLORIDE SERPL-SCNC: 103 MMOL/L (ref 99–109)
CO2 SERPL-SCNC: 31 MMOL/L (ref 20–31)
CREAT BLD-MCNC: 1 MG/DL (ref 0.6–1.3)
CRP SERPL-MCNC: 0.14 MG/DL (ref 0–1)
DEPRECATED RDW RBC AUTO: 41.5 FL (ref 37–54)
EOSINOPHIL # BLD AUTO: 0.31 10*3/MM3 (ref 0–0.3)
EOSINOPHIL NFR BLD AUTO: 5.4 % (ref 0–3)
ERYTHROCYTE [DISTWIDTH] IN BLOOD BY AUTOMATED COUNT: 13.1 % (ref 11.3–14.5)
ERYTHROCYTE [SEDIMENTATION RATE] IN BLOOD: 5 MM/HR (ref 0–15)
GFR SERPL CREATININE-BSD FRML MDRD: 81 ML/MIN/1.73
GLOBULIN UR ELPH-MCNC: 3 GM/DL
GLUCOSE BLD-MCNC: 99 MG/DL (ref 70–100)
HCT VFR BLD AUTO: 43.1 % (ref 38.9–50.9)
HGB BLD-MCNC: 14.8 G/DL (ref 13.1–17.5)
IMM GRANULOCYTES # BLD: 0.01 10*3/MM3 (ref 0–0.03)
IMM GRANULOCYTES NFR BLD: 0.2 % (ref 0–0.6)
LYMPHOCYTES # BLD AUTO: 1.91 10*3/MM3 (ref 0.6–4.8)
LYMPHOCYTES NFR BLD AUTO: 33.3 % (ref 24–44)
MCH RBC QN AUTO: 29.9 PG (ref 27–31)
MCHC RBC AUTO-ENTMCNC: 34.3 G/DL (ref 32–36)
MCV RBC AUTO: 87.1 FL (ref 80–99)
MONOCYTES # BLD AUTO: 0.28 10*3/MM3 (ref 0–1)
MONOCYTES NFR BLD AUTO: 4.9 % (ref 0–12)
NEUTROPHILS # BLD AUTO: 3.17 10*3/MM3 (ref 1.5–8.3)
NEUTROPHILS NFR BLD AUTO: 55.2 % (ref 41–71)
PLATELET # BLD AUTO: 201 10*3/MM3 (ref 150–450)
PMV BLD AUTO: 11.7 FL (ref 6–12)
POTASSIUM BLD-SCNC: 4.1 MMOL/L (ref 3.5–5.5)
PROT SERPL-MCNC: 7.7 G/DL (ref 5.7–8.2)
RBC # BLD AUTO: 4.95 10*6/MM3 (ref 4.2–5.76)
SODIUM BLD-SCNC: 141 MMOL/L (ref 132–146)
WBC NRBC COR # BLD: 5.74 10*3/MM3 (ref 3.5–10.8)

## 2018-04-10 PROCEDURE — 85025 COMPLETE CBC W/AUTO DIFF WBC: CPT | Performed by: INTERNAL MEDICINE

## 2018-04-10 PROCEDURE — 36415 COLL VENOUS BLD VENIPUNCTURE: CPT | Performed by: INTERNAL MEDICINE

## 2018-04-10 PROCEDURE — 80053 COMPREHEN METABOLIC PANEL: CPT | Performed by: INTERNAL MEDICINE

## 2018-04-10 PROCEDURE — 85652 RBC SED RATE AUTOMATED: CPT | Performed by: INTERNAL MEDICINE

## 2018-04-10 PROCEDURE — 86140 C-REACTIVE PROTEIN: CPT | Performed by: INTERNAL MEDICINE

## 2018-04-17 ENCOUNTER — LAB REQUISITION (OUTPATIENT)
Dept: LAB | Facility: HOSPITAL | Age: 43
End: 2018-04-17

## 2018-04-17 DIAGNOSIS — S61.242A: ICD-10-CM

## 2018-04-17 DIAGNOSIS — M86.141: ICD-10-CM

## 2018-04-17 DIAGNOSIS — M00.841: ICD-10-CM

## 2018-04-17 DIAGNOSIS — Z00.00 ROUTINE GENERAL MEDICAL EXAMINATION AT A HEALTH CARE FACILITY: ICD-10-CM

## 2018-04-17 LAB
ALBUMIN SERPL-MCNC: 4.5 G/DL (ref 3.2–4.8)
ALBUMIN/GLOB SERPL: 1.6 G/DL (ref 1.5–2.5)
ALP SERPL-CCNC: 44 U/L (ref 25–100)
ALT SERPL W P-5'-P-CCNC: 25 U/L (ref 7–40)
ANION GAP SERPL CALCULATED.3IONS-SCNC: 6 MMOL/L (ref 3–11)
AST SERPL-CCNC: 21 U/L (ref 0–33)
BASOPHILS # BLD AUTO: 0.03 10*3/MM3 (ref 0–0.2)
BASOPHILS NFR BLD AUTO: 0.6 % (ref 0–1)
BILIRUB SERPL-MCNC: 0.6 MG/DL (ref 0.3–1.2)
BUN BLD-MCNC: 13 MG/DL (ref 9–23)
BUN/CREAT SERPL: 13 (ref 7–25)
CALCIUM SPEC-SCNC: 9.4 MG/DL (ref 8.7–10.4)
CHLORIDE SERPL-SCNC: 102 MMOL/L (ref 99–109)
CO2 SERPL-SCNC: 31 MMOL/L (ref 20–31)
CREAT BLD-MCNC: 1 MG/DL (ref 0.6–1.3)
CRP SERPL-MCNC: 0.02 MG/DL (ref 0–1)
DEPRECATED RDW RBC AUTO: 40.5 FL (ref 37–54)
EOSINOPHIL # BLD AUTO: 0.24 10*3/MM3 (ref 0–0.3)
EOSINOPHIL NFR BLD AUTO: 4.4 % (ref 0–3)
ERYTHROCYTE [DISTWIDTH] IN BLOOD BY AUTOMATED COUNT: 12.9 % (ref 11.3–14.5)
ERYTHROCYTE [SEDIMENTATION RATE] IN BLOOD: 4 MM/HR (ref 0–15)
GFR SERPL CREATININE-BSD FRML MDRD: 81 ML/MIN/1.73
GLOBULIN UR ELPH-MCNC: 2.9 GM/DL
GLUCOSE BLD-MCNC: 95 MG/DL (ref 70–100)
HCT VFR BLD AUTO: 40 % (ref 38.9–50.9)
HGB BLD-MCNC: 14 G/DL (ref 13.1–17.5)
IMM GRANULOCYTES # BLD: 0 10*3/MM3 (ref 0–0.03)
IMM GRANULOCYTES NFR BLD: 0 % (ref 0–0.6)
LYMPHOCYTES # BLD AUTO: 2.22 10*3/MM3 (ref 0.6–4.8)
LYMPHOCYTES NFR BLD AUTO: 40.8 % (ref 24–44)
MCH RBC QN AUTO: 30.3 PG (ref 27–31)
MCHC RBC AUTO-ENTMCNC: 35 G/DL (ref 32–36)
MCV RBC AUTO: 86.6 FL (ref 80–99)
MONOCYTES # BLD AUTO: 0.34 10*3/MM3 (ref 0–1)
MONOCYTES NFR BLD AUTO: 6.3 % (ref 0–12)
NEUTROPHILS # BLD AUTO: 2.61 10*3/MM3 (ref 1.5–8.3)
NEUTROPHILS NFR BLD AUTO: 47.9 % (ref 41–71)
PLATELET # BLD AUTO: 188 10*3/MM3 (ref 150–450)
PMV BLD AUTO: 11.7 FL (ref 6–12)
POTASSIUM BLD-SCNC: 4.3 MMOL/L (ref 3.5–5.5)
PROT SERPL-MCNC: 7.4 G/DL (ref 5.7–8.2)
RBC # BLD AUTO: 4.62 10*6/MM3 (ref 4.2–5.76)
SODIUM BLD-SCNC: 139 MMOL/L (ref 132–146)
WBC NRBC COR # BLD: 5.44 10*3/MM3 (ref 3.5–10.8)

## 2018-04-17 PROCEDURE — 36415 COLL VENOUS BLD VENIPUNCTURE: CPT | Performed by: INTERNAL MEDICINE

## 2018-04-17 PROCEDURE — 85025 COMPLETE CBC W/AUTO DIFF WBC: CPT | Performed by: INTERNAL MEDICINE

## 2018-04-17 PROCEDURE — 80053 COMPREHEN METABOLIC PANEL: CPT | Performed by: INTERNAL MEDICINE

## 2018-04-17 PROCEDURE — 85652 RBC SED RATE AUTOMATED: CPT | Performed by: INTERNAL MEDICINE

## 2018-04-17 PROCEDURE — 86140 C-REACTIVE PROTEIN: CPT | Performed by: INTERNAL MEDICINE

## 2018-04-25 ENCOUNTER — LAB REQUISITION (OUTPATIENT)
Dept: LAB | Facility: HOSPITAL | Age: 43
End: 2018-04-25

## 2018-04-25 DIAGNOSIS — Z00.00 ROUTINE GENERAL MEDICAL EXAMINATION AT A HEALTH CARE FACILITY: ICD-10-CM

## 2018-04-25 LAB
ALBUMIN SERPL-MCNC: 4.8 G/DL (ref 3.2–4.8)
ALBUMIN/GLOB SERPL: 1.7 G/DL (ref 1.5–2.5)
ALP SERPL-CCNC: 45 U/L (ref 25–100)
ALT SERPL W P-5'-P-CCNC: 38 U/L (ref 7–40)
ANION GAP SERPL CALCULATED.3IONS-SCNC: 8 MMOL/L (ref 3–11)
AST SERPL-CCNC: 25 U/L (ref 0–33)
BASOPHILS # BLD AUTO: 0.04 10*3/MM3 (ref 0–0.2)
BASOPHILS NFR BLD AUTO: 0.8 % (ref 0–1)
BILIRUB SERPL-MCNC: 0.7 MG/DL (ref 0.3–1.2)
BUN BLD-MCNC: 14 MG/DL (ref 9–23)
BUN/CREAT SERPL: 14 (ref 7–25)
CALCIUM SPEC-SCNC: 10 MG/DL (ref 8.7–10.4)
CHLORIDE SERPL-SCNC: 103 MMOL/L (ref 99–109)
CO2 SERPL-SCNC: 32 MMOL/L (ref 20–31)
CREAT BLD-MCNC: 1 MG/DL (ref 0.6–1.3)
CRP SERPL-MCNC: 0.04 MG/DL (ref 0–1)
DEPRECATED RDW RBC AUTO: 41 FL (ref 37–54)
EOSINOPHIL # BLD AUTO: 0.25 10*3/MM3 (ref 0–0.3)
EOSINOPHIL NFR BLD AUTO: 5 % (ref 0–3)
ERYTHROCYTE [DISTWIDTH] IN BLOOD BY AUTOMATED COUNT: 13 % (ref 11.3–14.5)
ERYTHROCYTE [SEDIMENTATION RATE] IN BLOOD: 7 MM/HR (ref 0–15)
GFR SERPL CREATININE-BSD FRML MDRD: 81 ML/MIN/1.73
GLOBULIN UR ELPH-MCNC: 2.8 GM/DL
GLUCOSE BLD-MCNC: 97 MG/DL (ref 70–100)
HCT VFR BLD AUTO: 41.2 % (ref 38.9–50.9)
HGB BLD-MCNC: 14.1 G/DL (ref 13.1–17.5)
IMM GRANULOCYTES # BLD: 0 10*3/MM3 (ref 0–0.03)
IMM GRANULOCYTES NFR BLD: 0 % (ref 0–0.6)
LYMPHOCYTES # BLD AUTO: 2.11 10*3/MM3 (ref 0.6–4.8)
LYMPHOCYTES NFR BLD AUTO: 42.3 % (ref 24–44)
MCH RBC QN AUTO: 29.7 PG (ref 27–31)
MCHC RBC AUTO-ENTMCNC: 34.2 G/DL (ref 32–36)
MCV RBC AUTO: 86.7 FL (ref 80–99)
MONOCYTES # BLD AUTO: 0.24 10*3/MM3 (ref 0–1)
MONOCYTES NFR BLD AUTO: 4.8 % (ref 0–12)
NEUTROPHILS # BLD AUTO: 2.35 10*3/MM3 (ref 1.5–8.3)
NEUTROPHILS NFR BLD AUTO: 47.1 % (ref 41–71)
PLATELET # BLD AUTO: 165 10*3/MM3 (ref 150–450)
PMV BLD AUTO: 12 FL (ref 6–12)
POTASSIUM BLD-SCNC: 4.4 MMOL/L (ref 3.5–5.5)
PROT SERPL-MCNC: 7.6 G/DL (ref 5.7–8.2)
RBC # BLD AUTO: 4.75 10*6/MM3 (ref 4.2–5.76)
SODIUM BLD-SCNC: 143 MMOL/L (ref 132–146)
WBC NRBC COR # BLD: 4.99 10*3/MM3 (ref 3.5–10.8)

## 2018-04-25 PROCEDURE — 85025 COMPLETE CBC W/AUTO DIFF WBC: CPT | Performed by: INTERNAL MEDICINE

## 2018-04-25 PROCEDURE — 86140 C-REACTIVE PROTEIN: CPT | Performed by: INTERNAL MEDICINE

## 2018-04-25 PROCEDURE — 80053 COMPREHEN METABOLIC PANEL: CPT | Performed by: INTERNAL MEDICINE

## 2018-04-25 PROCEDURE — 85652 RBC SED RATE AUTOMATED: CPT | Performed by: INTERNAL MEDICINE

## 2018-05-20 ENCOUNTER — DOCUMENTATION (OUTPATIENT)
Dept: PHYSICAL THERAPY | Facility: HOSPITAL | Age: 43
End: 2018-05-20

## 2018-05-20 NOTE — THERAPY DISCHARGE NOTE
Outpatient Rehabilitation - Wound/Debridement Discharge Summary          Patient Name: Vini Pope  : 1975  MRN: [id number]  Today's Date: 2018                  Admit Date: (Not on file)    Visit Dx:  No diagnosis found.    There is no problem list on file for this patient.       No past medical history on file.     Past Surgical History:   Procedure Laterality Date   • CHOLECYSTECTOMY  2018         EVALUATION                [facility] Wound     Row Name               Wound 18 1300 Right finger puncture;abscess    Wound - Properties Group Date first assessed: 18  - Time first assessed: 1300  -JM Present On Admission : yes;picture taken  -JM Side: Right  -JM Location: finger  -JM Type: puncture;abscess  -JM Additional Comments: dorsal long finger, puncture with septic arthritis s/p I&D 3/23/18  -      User Key  (r) = Recorded By, (t) = Taken By, (c) = Cosigned By    Initials Name Provider Type    JUAN RAMON Ludwig, PT Physical Therapist          Goals        PT OP Goals     Row Name 18 1500          PT Short Term Goals    STG 1 Patient and/ or caregiver able to verbalize signs and symptoms of infection.  -     STG 1 Progress Met  -     STG 2 Decrease wound dimensions by 25% as evidence of wound closure.  -     STG 2 Progress Met  -     STG 3 Patient independent and compliant with initial home exercise program focused on range of motion, and flexibility to improve blood flow to facilitate healing.  -     STG 3 Progress Met  -        Long Term Goals    LTG 1 Patient and/ or caregiver independent with clean dressing changes.  -     LTG 1 Progress Met  -     LTG 2 Decrease wound dimensions by 75% as evidence of wound closure.  -     LTG 2 Progress Partially Met;Ongoing  -     LTG 3 Patient to report decrease in pain to 2/10, to facilitate improved functional mobility.  -     LTG 3 Progress Met  -       User Key  (r) = Recorded By, (t) = Taken By, (c)  = Cosigned By    Initials Name Provider Type    JUAN RAMON Ludwig, PT Physical Therapist                  OP Discharge Summary     Row Name 05/20/18 1543             OP PT Discharge Summary    Date of Discharge 03/24/18  -JUAN RAMON      Reason for Discharge Maximum functional potential achieved;Independent  -JUAN RAMON      Outcomes Achieved Patient able to partially acheive established goals  -JUAN RAMON      Discharge Destination Home with home program  -JUAN RAMON      Discharge Instructions/Additional Comments Pt was to have follow-up with his surgeon 3/24/18 and call for ongoing tx pending MD recommendations.  Pt has not returned for tx.  Anticipate no further skilled PT wound care needs.  -        User Key  (r) = Recorded By, (t) = Taken By, (c) = Cosigned By    Initials Name Provider Type    JUAN RAMON Ludwig, BLAKE Physical Therapist          Taniya Ludwig, PT  5/20/2018

## 2018-05-23 ENCOUNTER — LAB REQUISITION (OUTPATIENT)
Dept: LAB | Facility: HOSPITAL | Age: 43
End: 2018-05-23

## 2018-05-23 DIAGNOSIS — Z00.00 ROUTINE GENERAL MEDICAL EXAMINATION AT A HEALTH CARE FACILITY: ICD-10-CM

## 2018-05-23 LAB
ALBUMIN SERPL-MCNC: 4.6 G/DL (ref 3.2–4.8)
ALBUMIN/GLOB SERPL: 1.5 G/DL (ref 1.5–2.5)
ALP SERPL-CCNC: 44 U/L (ref 25–100)
ALT SERPL W P-5'-P-CCNC: 55 U/L (ref 7–40)
ANION GAP SERPL CALCULATED.3IONS-SCNC: 9 MMOL/L (ref 3–11)
AST SERPL-CCNC: 33 U/L (ref 0–33)
BASOPHILS # BLD AUTO: 0.03 10*3/MM3 (ref 0–0.2)
BASOPHILS NFR BLD AUTO: 0.6 % (ref 0–1)
BILIRUB SERPL-MCNC: 1 MG/DL (ref 0.3–1.2)
BUN BLD-MCNC: 12 MG/DL (ref 9–23)
BUN/CREAT SERPL: 12 (ref 7–25)
CALCIUM SPEC-SCNC: 9.1 MG/DL (ref 8.7–10.4)
CHLORIDE SERPL-SCNC: 105 MMOL/L (ref 99–109)
CO2 SERPL-SCNC: 28 MMOL/L (ref 20–31)
CREAT BLD-MCNC: 1 MG/DL (ref 0.6–1.3)
CRP SERPL-MCNC: 0.03 MG/DL (ref 0–1)
DEPRECATED RDW RBC AUTO: 40.9 FL (ref 37–54)
EOSINOPHIL # BLD AUTO: 0.09 10*3/MM3 (ref 0–0.3)
EOSINOPHIL NFR BLD AUTO: 1.8 % (ref 0–3)
ERYTHROCYTE [DISTWIDTH] IN BLOOD BY AUTOMATED COUNT: 13 % (ref 11.3–14.5)
ERYTHROCYTE [SEDIMENTATION RATE] IN BLOOD: 6 MM/HR (ref 0–15)
GFR SERPL CREATININE-BSD FRML MDRD: 81 ML/MIN/1.73
GLOBULIN UR ELPH-MCNC: 3 GM/DL
GLUCOSE BLD-MCNC: 100 MG/DL (ref 70–100)
HCT VFR BLD AUTO: 40.8 % (ref 38.9–50.9)
HGB BLD-MCNC: 14.1 G/DL (ref 13.1–17.5)
IMM GRANULOCYTES # BLD: 0.01 10*3/MM3 (ref 0–0.03)
IMM GRANULOCYTES NFR BLD: 0.2 % (ref 0–0.6)
LYMPHOCYTES # BLD AUTO: 1.84 10*3/MM3 (ref 0.6–4.8)
LYMPHOCYTES NFR BLD AUTO: 36.9 % (ref 24–44)
MCH RBC QN AUTO: 29.9 PG (ref 27–31)
MCHC RBC AUTO-ENTMCNC: 34.6 G/DL (ref 32–36)
MCV RBC AUTO: 86.4 FL (ref 80–99)
MONOCYTES # BLD AUTO: 0.23 10*3/MM3 (ref 0–1)
MONOCYTES NFR BLD AUTO: 4.6 % (ref 0–12)
NEUTROPHILS # BLD AUTO: 2.79 10*3/MM3 (ref 1.5–8.3)
NEUTROPHILS NFR BLD AUTO: 56.1 % (ref 41–71)
PLATELET # BLD AUTO: 204 10*3/MM3 (ref 150–450)
PMV BLD AUTO: 11.2 FL (ref 6–12)
POTASSIUM BLD-SCNC: 3.9 MMOL/L (ref 3.5–5.5)
PROT SERPL-MCNC: 7.6 G/DL (ref 5.7–8.2)
RBC # BLD AUTO: 4.72 10*6/MM3 (ref 4.2–5.76)
SODIUM BLD-SCNC: 142 MMOL/L (ref 132–146)
WBC NRBC COR # BLD: 4.98 10*3/MM3 (ref 3.5–10.8)

## 2018-05-23 PROCEDURE — 86140 C-REACTIVE PROTEIN: CPT | Performed by: INTERNAL MEDICINE

## 2018-05-23 PROCEDURE — 36415 COLL VENOUS BLD VENIPUNCTURE: CPT | Performed by: INTERNAL MEDICINE

## 2018-05-23 PROCEDURE — 85025 COMPLETE CBC W/AUTO DIFF WBC: CPT | Performed by: INTERNAL MEDICINE

## 2018-05-23 PROCEDURE — 80053 COMPREHEN METABOLIC PANEL: CPT | Performed by: INTERNAL MEDICINE

## 2021-04-22 ENCOUNTER — IMMUNIZATION (OUTPATIENT)
Dept: VACCINE CLINIC | Facility: HOSPITAL | Age: 46
End: 2021-04-22

## 2021-04-22 PROCEDURE — 0001A: CPT | Performed by: INTERNAL MEDICINE

## 2021-04-22 PROCEDURE — 91300 HC SARSCOV02 VAC 30MCG/0.3ML IM: CPT | Performed by: INTERNAL MEDICINE
